# Patient Record
Sex: MALE | Race: WHITE | NOT HISPANIC OR LATINO
[De-identification: names, ages, dates, MRNs, and addresses within clinical notes are randomized per-mention and may not be internally consistent; named-entity substitution may affect disease eponyms.]

---

## 2022-07-05 ENCOUNTER — APPOINTMENT (OUTPATIENT)
Dept: NEUROLOGY | Facility: CLINIC | Age: 32
End: 2022-07-05

## 2022-07-05 PROBLEM — Z00.00 ENCOUNTER FOR PREVENTIVE HEALTH EXAMINATION: Status: ACTIVE | Noted: 2022-07-05

## 2022-11-29 ENCOUNTER — APPOINTMENT (OUTPATIENT)
Dept: UROLOGY | Facility: CLINIC | Age: 32
End: 2022-11-29

## 2022-11-29 VITALS
OXYGEN SATURATION: 97 % | DIASTOLIC BLOOD PRESSURE: 81 MMHG | HEART RATE: 75 BPM | BODY MASS INDEX: 27.77 KG/M2 | SYSTOLIC BLOOD PRESSURE: 144 MMHG | WEIGHT: 205 LBS | TEMPERATURE: 98.3 F | HEIGHT: 72 IN

## 2022-11-29 DIAGNOSIS — R33.9 RETENTION OF URINE, UNSPECIFIED: ICD-10-CM

## 2022-11-29 PROCEDURE — 99205 OFFICE O/P NEW HI 60 MIN: CPT

## 2022-11-29 PROCEDURE — 51798 US URINE CAPACITY MEASURE: CPT

## 2022-11-29 RX ORDER — CLOMIPHENE CITRATE 50 MG/1
50 TABLET ORAL
Qty: 26 | Refills: 0 | Status: ACTIVE | COMMUNITY
Start: 2022-09-13

## 2022-11-29 NOTE — ASSESSMENT
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
## 2022-11-29 NOTE — PHYSICAL EXAM
[General Appearance - Well Developed] : well developed [General Appearance - Well Nourished] : well nourished [Normal Appearance] : normal appearance [Well Groomed] : well groomed [General Appearance - In No Acute Distress] : no acute distress [Edema] : no peripheral edema [Respiration, Rhythm And Depth] : normal respiratory rhythm and effort [Exaggerated Use Of Accessory Muscles For Inspiration] : no accessory muscle use [Abdomen Soft] : soft [Abdomen Tenderness] : non-tender [Costovertebral Angle Tenderness] : no ~M costovertebral angle tenderness [Urethral Meatus] : meatus normal [Penis Abnormality] : normal circumcised penis [Urinary Bladder Findings] : the bladder was normal on palpation [Scrotum] : the scrotum was normal [Testes Mass (___cm)] : there were no testicular masses [Prostate Tenderness] : the prostate was not tender [No Prostate Nodules] : no prostate nodules [Normal Station and Gait] : the gait and station were normal for the patient's age [] : no rash [No Focal Deficits] : no focal deficits [Oriented To Time, Place, And Person] : oriented to person, place, and time [Affect] : the affect was normal [Mood] : the mood was normal [Not Anxious] : not anxious

## 2022-12-01 LAB — BACTERIA UR CULT: NORMAL

## 2022-12-08 ENCOUNTER — APPOINTMENT (OUTPATIENT)
Dept: UROLOGY | Facility: CLINIC | Age: 32
End: 2022-12-08

## 2022-12-15 ENCOUNTER — APPOINTMENT (OUTPATIENT)
Dept: UROLOGY | Facility: CLINIC | Age: 32
End: 2022-12-15

## 2022-12-15 PROCEDURE — 52000 CYSTOURETHROSCOPY: CPT

## 2022-12-15 NOTE — HISTORY OF PRESENT ILLNESS
[FreeTextEntry1] : \par \par PVR last visit >600\par On prior outside eval 450cc\par \par Today  then voided a second time PVR 93cc\par \par Clinical picture consistent with primary bladder outlet obstruction; thick walled and trabeculated bladder, high voiding pressures, hydro without obstruction on MAG3, and cystoscopy today: grade 3 trabeculation, high riding bladder neck and obstructed outlet.\par \par Recommend unilateral BN incision\par \par Risks comprehensively reviewed including risk of retrograde ejaculation\par Discussed sperm banking\par \par Reece Chang MD, FACS, FRCS \par  of Urology [facility]\par Director of Laparoscopic and Robotic Surgery \par [facility] Director of Urology, [facility] \par Professor of Urology\par \par (Office) [phone]\par (Cell)  [phone] \par [email or web address]\par \par \par \par

## 2023-02-07 ENCOUNTER — OUTPATIENT (OUTPATIENT)
Dept: OUTPATIENT SERVICES | Facility: HOSPITAL | Age: 33
LOS: 1 days | End: 2023-02-07
Payer: COMMERCIAL

## 2023-02-07 ENCOUNTER — APPOINTMENT (OUTPATIENT)
Dept: BURN CARE | Facility: CLINIC | Age: 33
End: 2023-02-07
Payer: OTHER MISCELLANEOUS

## 2023-02-07 VITALS — HEART RATE: 67 BPM | SYSTOLIC BLOOD PRESSURE: 122 MMHG | DIASTOLIC BLOOD PRESSURE: 84 MMHG

## 2023-02-07 DIAGNOSIS — Z00.8 ENCOUNTER FOR OTHER GENERAL EXAMINATION: ICD-10-CM

## 2023-02-07 PROCEDURE — 99202 OFFICE O/P NEW SF 15 MIN: CPT

## 2023-02-07 NOTE — REASON FOR VISIT
[Initial] : initial visit [Were you seen in the Emergency Room?] : seen in the emergency room [Were you admitted to the burn center at [facility]?] : not admitted to the burn center at [facility]

## 2023-02-07 NOTE — ASSESSMENT
[FreeTextEntry1] : 2% TBSA right forearm and upper arm is healed. The wound is pink and dry. The extremity is warm and has no edema. The patient was instructed to clean  the wound with soap and water. Continue local wound care with moisturizer and sunscreen. Follow up prn.  [Wound Care] : wound care no laceration
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
## 2023-02-07 NOTE — PHYSICAL EXAM
[Closed] : closed [Size%: ______] : Size: [unfilled]% [Infected?] : Infected: No [3] : 3 out of 10 [Abnormal] : abnormal [Large] : medium [] : no [de-identified] : 2% TBSA right forearm and upper arm is healed. The wound is pink and dry. The extremity is warm and has no edema. The patient was instructed to clean  the wound with soap and water. Continue local wound care with moisturizer and sunscreen. Follow up prn.  [TWNoteComboBox1] : KARYN

## 2023-02-14 DIAGNOSIS — Z87.828 PERSONAL HISTORY OF OTHER (HEALED) PHYSICAL INJURY AND TRAUMA: ICD-10-CM

## 2023-02-14 DIAGNOSIS — Z09 ENCOUNTER FOR FOLLOW-UP EXAMINATION AFTER COMPLETED TREATMENT FOR CONDITIONS OTHER THAN MALIGNANT NEOPLASM: ICD-10-CM

## 2024-08-12 ENCOUNTER — APPOINTMENT (OUTPATIENT)
Dept: ORTHOPEDIC SURGERY | Facility: CLINIC | Age: 34
End: 2024-08-12

## 2024-08-13 ENCOUNTER — APPOINTMENT (OUTPATIENT)
Dept: ORTHOPEDIC SURGERY | Facility: CLINIC | Age: 34
End: 2024-08-13

## 2024-08-13 VITALS — HEIGHT: 72 IN | WEIGHT: 210 LBS | BODY MASS INDEX: 28.44 KG/M2

## 2024-08-13 DIAGNOSIS — S62.635A DISPLACED FRACTURE OF DISTAL PHALANX OF LEFT RING FINGER, INITIAL ENCOUNTER FOR CLOSED FRACTURE: ICD-10-CM

## 2024-08-13 PROCEDURE — 11740 EVACUATION SUBUNGUAL HMTMA: CPT

## 2024-08-13 PROCEDURE — 99203 OFFICE O/P NEW LOW 30 MIN: CPT | Mod: ACP

## 2024-08-13 PROCEDURE — 73140 X-RAY EXAM OF FINGER(S): CPT | Mod: LT

## 2024-08-13 NOTE — IMAGING
[de-identified] : On examination of his left ring finger he has swelling and ecchymosis of the tip of the finger.  He has a subungual hematoma of most of the nail.  Skin is intact.  There is no open wounds.  No erythema, no drainage.  He has some tenderness to palpation over the distal phalanx.  No tenderness over the middle or the proximal phalanx.  He is able to flex and extend at the DIP joint but has some decreased range of motion.  He is able to fully flex and extend at the MCP and PIP joints.  There is no deformity.  Sensation is intact throughout.  X-rays taken in the office today show a comminuted, nondisplaced tuft fracture at the tip of the distal phalanx of the ring finger.  No other fractures, dislocations, or other bony abnormalities noted.
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
## 2024-08-13 NOTE — DISCUSSION/SUMMARY
[de-identified] : At this time I discussed with him attempting to drain the subungual hematoma again, he would like to do this today.  The finger was cleaned and prepped with alcohol and Betadine.  Using an 18-gauge needle I punctured a hole in the nail and was able to drain the blood and decompress the subungual hematoma.  Patient tolerated this well.  The finger was cleaned with alcohol and covered with a Band-Aid.  I discussed when he can wear the splint for comfort.  I discussed that it is okay for him to take the splint off and work on his range of motion.  Rest, ice, anti-inflammatories as needed for pain.  I discussed with him that his nail may fall off and it can take up to 6 months for a new normal nail to grow in.  He is going to remain out of work for now.  Will see him back in 2 to 3 weeks for repeat x-rays and evaluation. Patient will call me if any other problems or concerns.  Patient verbalized understanding and agreed with the plan, all questions were answered in the office today.

## 2024-09-03 ENCOUNTER — RESULT CHARGE (OUTPATIENT)
Age: 34
End: 2024-09-03

## 2024-09-03 ENCOUNTER — APPOINTMENT (OUTPATIENT)
Dept: ORTHOPEDIC SURGERY | Facility: CLINIC | Age: 34
End: 2024-09-03
Payer: OTHER MISCELLANEOUS

## 2024-09-03 ENCOUNTER — NON-APPOINTMENT (OUTPATIENT)
Age: 34
End: 2024-09-03

## 2024-09-03 DIAGNOSIS — S62.635A DISPLACED FRACTURE OF DISTAL PHALANX OF LEFT RING FINGER, INITIAL ENCOUNTER FOR CLOSED FRACTURE: ICD-10-CM

## 2024-09-03 PROCEDURE — 73140 X-RAY EXAM OF FINGER(S): CPT | Mod: LT

## 2024-09-03 PROCEDURE — 99202 OFFICE O/P NEW SF 15 MIN: CPT
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
## 2024-09-03 NOTE — ASSESSMENT
[FreeTextEntry1] : Patient had distal phalanx tuft fracture.  Along with nailbed hematoma.  He is going to return back to full work activities.  He will see me back on an as-needed basis.  Cold intolerance and numbness were discussed with the patient.  Fracture healing was discussed as well with the possibility of incomplete fracture healing

## 2024-09-03 NOTE — PHYSICAL EXAM
Surgery [de-identified] : Patient has resolved nailbed hematoma.  Is loose proximally.  Good range of motion to the finger.  Minimal tenderness with pressure at the tip of the finger Gastroenterology

## 2024-09-03 NOTE — DATA REVIEWED
[FreeTextEntry1] : Radiographs 3 views of the left ring finger reviewed showing comminuted distal phalanx tuft fracture